# Patient Record
Sex: MALE | Race: WHITE | NOT HISPANIC OR LATINO | Employment: STUDENT | URBAN - METROPOLITAN AREA
[De-identification: names, ages, dates, MRNs, and addresses within clinical notes are randomized per-mention and may not be internally consistent; named-entity substitution may affect disease eponyms.]

---
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
## 2024-02-09 DIAGNOSIS — R56.9 SEIZURES: ICD-10-CM

## 2024-02-09 RX ORDER — DIVALPROEX SODIUM 250 MG/1
TABLET, FILM COATED, EXTENDED RELEASE ORAL
Qty: 240 TABLET | Refills: 2 | Status: SHIPPED | OUTPATIENT
Start: 2024-02-09 | End: 2024-05-03

## 2024-05-03 DIAGNOSIS — R56.9 SEIZURES: ICD-10-CM

## 2024-05-03 RX ORDER — DIVALPROEX SODIUM 250 MG/1
TABLET, FILM COATED, EXTENDED RELEASE ORAL
Qty: 240 TABLET | Refills: 0 | Status: SHIPPED | OUTPATIENT
Start: 2024-05-03 | End: 2024-05-30 | Stop reason: SDUPTHER

## 2024-05-30 ENCOUNTER — OFFICE VISIT (OUTPATIENT)
Dept: NEUROLOGY | Facility: CLINIC | Age: 19
End: 2024-05-30
Payer: COMMERCIAL

## 2024-05-30 VITALS
WEIGHT: 140 LBS | HEIGHT: 69 IN | BODY MASS INDEX: 20.73 KG/M2 | DIASTOLIC BLOOD PRESSURE: 82 MMHG | SYSTOLIC BLOOD PRESSURE: 120 MMHG

## 2024-05-30 DIAGNOSIS — R56.9 SEIZURES: Primary | ICD-10-CM

## 2024-05-30 DIAGNOSIS — G25.1 DRUG-INDUCED TREMOR: ICD-10-CM

## 2024-05-30 PROCEDURE — 1159F MED LIST DOCD IN RCRD: CPT | Mod: CPTII,S$GLB,, | Performed by: NURSE PRACTITIONER

## 2024-05-30 PROCEDURE — 99214 OFFICE O/P EST MOD 30 MIN: CPT | Mod: S$GLB,,, | Performed by: NURSE PRACTITIONER

## 2024-05-30 PROCEDURE — 99999 PR PBB SHADOW E&M-EST. PATIENT-LVL III: CPT | Mod: PBBFAC,,, | Performed by: NURSE PRACTITIONER

## 2024-05-30 PROCEDURE — 3079F DIAST BP 80-89 MM HG: CPT | Mod: CPTII,S$GLB,, | Performed by: NURSE PRACTITIONER

## 2024-05-30 PROCEDURE — 3008F BODY MASS INDEX DOCD: CPT | Mod: CPTII,S$GLB,, | Performed by: NURSE PRACTITIONER

## 2024-05-30 PROCEDURE — 3074F SYST BP LT 130 MM HG: CPT | Mod: CPTII,S$GLB,, | Performed by: NURSE PRACTITIONER

## 2024-05-30 RX ORDER — DIVALPROEX SODIUM 250 MG/1
TABLET, FILM COATED, EXTENDED RELEASE ORAL
Qty: 240 TABLET | Refills: 11 | Status: SHIPPED | OUTPATIENT
Start: 2024-05-30

## 2024-05-30 NOTE — PROGRESS NOTES
"  SEIZURE FOLLOW UP    SUBJECTIVE:  Patient ID: Roberto Yanez   19 y.o.    Chief Complaint: 6 mo f/u for seizure     History of Present Illness:     Presents today for seizure follow up.     Denies seizure- like activity since last visit. Last known seizure was Jan 25, 2023     Reports continued hand tremor. Tremor is constant and R hand worse than L.     Currently taking Depakote 1000mg BID ; denies side effects with medication    Sleeps well at night.      Review of Systems - as per HPI, otherwise a balanced 10 systems review is negative.    Current Medications:  Current Outpatient Medications   Medication Instructions    divalproex ER (DEPAKOTE ER) 250 MG 24 hr tablet TAKE FOUR (4) BY MOUTH TWICE DAILY.         OBJECTIVE:  Vitals:  /82 (BP Location: Right arm, Patient Position: Sitting)   Ht 5' 9" (1.753 m)   Wt 63.5 kg (140 lb)   BMI 20.67 kg/m²     Physical Exam:  Constitutional: he appears well-developed and well-nourished. he is well groomed.    Head: Normocephalic and atraumatic  Skin: Skin is warm and dry.  Psychiatric: Normal mood and affect.     Neuro exam:    The patient is alert and oriented   Speech is normal   CN 8 - hearing is grossly normal  Gait - unassisted; posture upright. gait is steady with normal steps  Slight action tremor, R>L    Review of Data:   Prior office notes reviewed          ASSESSMENT /PLAN:    Problem List Items Addressed This Visit          Neuro    Seizures - Primary    Continue  taking Depakote 1000mg BID    Avoid medications such as Tramadol, Wellbutrin, Cipro, Levaquin     Avoid ladders/heights     Avoid binge drinking     Avoid sleep deprivation      - RTC in 1 yr    Drug Induced Tremor    Mild and not bothersome to pt    R/t Depakote       Questions and concerns were sought and answered to the patient's stated verbal satisfaction.    The patient verbalizes understanding and agreement with the above stated treatment plan.   Items discussed include acute " and/or chronic neurological, sleep, or other issues and their attendant differential diagnoses.  Potential for additional testing, treatment options, and prognosis also discussed.    __single dx _*__multiple issues/ diagnoses  __ low _*_mod ___ high complexity of data  _*_low __mod ___ high risks     Medical Decision Making (MDM) used for CPT choice:  ___low  _*__moderate  ____high          ROBERTA Cedillo  [facility]  [phone]

## 2025-03-07 NOTE — ADDENDUM NOTE
Addended by: PETE MARTE on: 2/2/2023 10:10 AM     Modules accepted: Orders     [Time Spent: ___ minutes] : I have spent [unfilled] minutes of time on the encounter which excludes teaching and separately reported services.

## 2025-05-11 DIAGNOSIS — R56.9 SEIZURES: Primary | ICD-10-CM

## 2025-05-12 RX ORDER — DIVALPROEX SODIUM 250 MG/1
TABLET, FILM COATED, EXTENDED RELEASE ORAL
Qty: 240 TABLET | Refills: 11 | Status: SHIPPED | OUTPATIENT
Start: 2025-05-12

## 2025-06-02 ENCOUNTER — PATIENT MESSAGE (OUTPATIENT)
Dept: NEUROLOGY | Facility: CLINIC | Age: 20
End: 2025-06-02

## 2025-06-02 ENCOUNTER — OFFICE VISIT (OUTPATIENT)
Dept: NEUROLOGY | Facility: CLINIC | Age: 20
End: 2025-06-02
Payer: COMMERCIAL

## 2025-06-02 VITALS
BODY MASS INDEX: 19.26 KG/M2 | DIASTOLIC BLOOD PRESSURE: 88 MMHG | WEIGHT: 130 LBS | HEIGHT: 69 IN | SYSTOLIC BLOOD PRESSURE: 136 MMHG

## 2025-06-02 DIAGNOSIS — R56.9 SEIZURES: Primary | ICD-10-CM

## 2025-06-02 PROCEDURE — 3075F SYST BP GE 130 - 139MM HG: CPT | Mod: CPTII,S$GLB,, | Performed by: NURSE PRACTITIONER

## 2025-06-02 PROCEDURE — 1159F MED LIST DOCD IN RCRD: CPT | Mod: CPTII,S$GLB,, | Performed by: NURSE PRACTITIONER

## 2025-06-02 PROCEDURE — 3079F DIAST BP 80-89 MM HG: CPT | Mod: CPTII,S$GLB,, | Performed by: NURSE PRACTITIONER

## 2025-06-02 PROCEDURE — 99214 OFFICE O/P EST MOD 30 MIN: CPT | Mod: S$GLB,,, | Performed by: NURSE PRACTITIONER

## 2025-06-02 PROCEDURE — 3008F BODY MASS INDEX DOCD: CPT | Mod: CPTII,S$GLB,, | Performed by: NURSE PRACTITIONER

## 2025-06-02 PROCEDURE — 99999 PR PBB SHADOW E&M-EST. PATIENT-LVL III: CPT | Mod: PBBFAC,,, | Performed by: NURSE PRACTITIONER

## 2025-06-24 ENCOUNTER — PATIENT MESSAGE (OUTPATIENT)
Dept: NEUROLOGY | Facility: CLINIC | Age: 20
End: 2025-06-24
Payer: COMMERCIAL

## 2025-06-26 ENCOUNTER — PATIENT MESSAGE (OUTPATIENT)
Dept: NEUROLOGY | Facility: CLINIC | Age: 20
End: 2025-06-26
Payer: COMMERCIAL

## 2025-06-26 NOTE — LETTER
[facility] [address] , [address]  [place] [address]  Phone: [phone] June 26, 2025    Roberto Yanez  [address] [address]       Roberto Maxwell is unable to participate in jury duty due to epilepsy and effects of antiepileptic medication.    If you have any questions or concerns, please feel free to call my office.    Sincerely,        Neha Herron, CAIT-C